# Patient Record
Sex: FEMALE | Race: AMERICAN INDIAN OR ALASKA NATIVE
[De-identification: names, ages, dates, MRNs, and addresses within clinical notes are randomized per-mention and may not be internally consistent; named-entity substitution may affect disease eponyms.]

---

## 2018-01-03 ENCOUNTER — HOSPITAL ENCOUNTER (OUTPATIENT)
Dept: HOSPITAL 5 - CT | Age: 58
Discharge: HOME | End: 2018-01-03
Attending: INTERNAL MEDICINE
Payer: COMMERCIAL

## 2018-01-03 DIAGNOSIS — J32.9: ICD-10-CM

## 2018-01-03 DIAGNOSIS — G44.009: Primary | ICD-10-CM

## 2018-01-03 PROCEDURE — 70450 CT HEAD/BRAIN W/O DYE: CPT

## 2018-01-03 NOTE — CAT SCAN REPORT
Cranial CT without contrast.



History: Headaches.



Findings: The brain parenchyma is normal. There is no evidence of 

hemorrhage, infarct, or extra-axial collection. The ventricles are 

normal in size and contour. There are no masses areas of cortical sulci 

appear normal. The calvarium is intact. There is extensive 

mucoperiosteal thickening in the maxillary and ethmoid sinuses 

bilaterally with associated postoperative changes.



Impression: 1. No intracranial abnormalities.



2. Chronic sinus disease and postoperative sinus changes.